# Patient Record
Sex: MALE | Race: AMERICAN INDIAN OR ALASKA NATIVE | NOT HISPANIC OR LATINO | Employment: OTHER | URBAN - METROPOLITAN AREA
[De-identification: names, ages, dates, MRNs, and addresses within clinical notes are randomized per-mention and may not be internally consistent; named-entity substitution may affect disease eponyms.]

---

## 2023-10-02 ENCOUNTER — EXTERNAL HOSPITAL ADMISSION (OUTPATIENT)
Dept: NEUROSURGERY | Facility: CLINIC | Age: 28
End: 2023-10-02
Payer: COMMERCIAL

## 2023-10-06 ENCOUNTER — APPOINTMENT (OUTPATIENT)
Dept: RADIOLOGY | Facility: HOSPITAL | Age: 28
DRG: 086 | End: 2023-10-06
Payer: COMMERCIAL

## 2023-10-06 ENCOUNTER — HOSPITAL ENCOUNTER (OUTPATIENT)
Facility: HOSPITAL | Age: 28
Setting detail: OBSERVATION
LOS: 1 days | Discharge: HOME | DRG: 086 | End: 2023-10-06
Attending: NEUROLOGICAL SURGERY | Admitting: NEUROLOGICAL SURGERY
Payer: COMMERCIAL

## 2023-10-06 VITALS
DIASTOLIC BLOOD PRESSURE: 94 MMHG | WEIGHT: 176.37 LBS | BODY MASS INDEX: 22.63 KG/M2 | OXYGEN SATURATION: 99 % | RESPIRATION RATE: 13 BRPM | HEIGHT: 74 IN | TEMPERATURE: 99.6 F | HEART RATE: 62 BPM | SYSTOLIC BLOOD PRESSURE: 137 MMHG

## 2023-10-06 DIAGNOSIS — G08 ACUTE IDIOPATHIC CVST (HHS-HCC): ICD-10-CM

## 2023-10-06 DIAGNOSIS — R60.9 SWELLING: Primary | ICD-10-CM

## 2023-10-06 PROBLEM — S06.5XAA: Status: ACTIVE | Noted: 2023-10-06

## 2023-10-06 PROBLEM — S43.102A: Status: ACTIVE | Noted: 2023-10-06

## 2023-10-06 PROBLEM — S02.19XD CLOSED FRACTURE OF TEMPORAL BONE WITH ROUTINE HEALING: Status: ACTIVE | Noted: 2023-10-06

## 2023-10-06 LAB
ALBUMIN SERPL BCP-MCNC: 5 G/DL (ref 3.4–5)
ANION GAP SERPL CALC-SCNC: 20 MMOL/L (ref 10–20)
APTT PPP: 32 SECONDS (ref 27–38)
BUN SERPL-MCNC: 16 MG/DL (ref 6–23)
CALCIUM SERPL-MCNC: 10.3 MG/DL (ref 8.6–10.6)
CARDIAC TROPONIN I PNL SERPL HS: 3 NG/L (ref 0–53)
CHLORIDE SERPL-SCNC: 104 MMOL/L (ref 98–107)
CO2 SERPL-SCNC: 19 MMOL/L (ref 21–32)
CREAT SERPL-MCNC: 0.74 MG/DL (ref 0.5–1.3)
ERYTHROCYTE [DISTWIDTH] IN BLOOD BY AUTOMATED COUNT: 13.2 % (ref 11.5–14.5)
GFR SERPL CREATININE-BSD FRML MDRD: >90 ML/MIN/1.73M*2
GLUCOSE BLD MANUAL STRIP-MCNC: 103 MG/DL (ref 74–99)
GLUCOSE BLD MANUAL STRIP-MCNC: 106 MG/DL (ref 74–99)
GLUCOSE BLD MANUAL STRIP-MCNC: 113 MG/DL (ref 74–99)
GLUCOSE BLD MANUAL STRIP-MCNC: 95 MG/DL (ref 74–99)
GLUCOSE SERPL-MCNC: 122 MG/DL (ref 74–99)
HCT VFR BLD AUTO: 46.4 % (ref 41–52)
HGB BLD-MCNC: 15.9 G/DL (ref 13.5–17.5)
INR PPP: 1.2 (ref 0.9–1.1)
MAGNESIUM SERPL-MCNC: 2.02 MG/DL (ref 1.6–2.4)
MCH RBC QN AUTO: 27.7 PG (ref 26–34)
MCHC RBC AUTO-ENTMCNC: 34.3 G/DL (ref 32–36)
MCV RBC AUTO: 81 FL (ref 80–100)
NRBC BLD-RTO: 0 /100 WBCS (ref 0–0)
PHOSPHATE SERPL-MCNC: 5.2 MG/DL (ref 2.5–4.9)
PLATELET # BLD AUTO: 319 X10*3/UL (ref 150–450)
PMV BLD AUTO: 9.6 FL (ref 7.5–11.5)
POTASSIUM SERPL-SCNC: 4.6 MMOL/L (ref 3.5–5.3)
PROTHROMBIN TIME: 13.5 SECONDS (ref 9.8–12.8)
RBC # BLD AUTO: 5.73 X10*6/UL (ref 4.5–5.9)
SODIUM SERPL-SCNC: 138 MMOL/L (ref 136–145)
UFH PPP CHRO-ACNC: 0.1 IU/ML
UFH PPP CHRO-ACNC: 0.3 IU/ML
UFH PPP CHRO-ACNC: 0.7 IU/ML
UFH PPP CHRO-ACNC: 0.9 IU/ML
UFH PPP CHRO-ACNC: 1 IU/ML
UFH PPP CHRO-ACNC: <0.1 IU/ML
WBC # BLD AUTO: 14.6 X10*3/UL (ref 4.4–11.3)

## 2023-10-06 PROCEDURE — 85730 THROMBOPLASTIN TIME PARTIAL: CPT | Performed by: STUDENT IN AN ORGANIZED HEALTH CARE EDUCATION/TRAINING PROGRAM

## 2023-10-06 PROCEDURE — 36415 COLL VENOUS BLD VENIPUNCTURE: CPT | Performed by: REGISTERED NURSE

## 2023-10-06 PROCEDURE — 2550000001 HC RX 255 CONTRASTS: Performed by: NEUROLOGICAL SURGERY

## 2023-10-06 PROCEDURE — 85027 COMPLETE CBC AUTOMATED: CPT | Performed by: STUDENT IN AN ORGANIZED HEALTH CARE EDUCATION/TRAINING PROGRAM

## 2023-10-06 PROCEDURE — 2500000001 HC RX 250 WO HCPCS SELF ADMINISTERED DRUGS (ALT 637 FOR MEDICARE OP): Performed by: STUDENT IN AN ORGANIZED HEALTH CARE EDUCATION/TRAINING PROGRAM

## 2023-10-06 PROCEDURE — 82947 ASSAY GLUCOSE BLOOD QUANT: CPT | Performed by: STUDENT IN AN ORGANIZED HEALTH CARE EDUCATION/TRAINING PROGRAM

## 2023-10-06 PROCEDURE — 70496 CT ANGIOGRAPHY HEAD: CPT | Performed by: RADIOLOGY

## 2023-10-06 PROCEDURE — 85520 HEPARIN ASSAY: CPT | Performed by: STUDENT IN AN ORGANIZED HEALTH CARE EDUCATION/TRAINING PROGRAM

## 2023-10-06 PROCEDURE — G0378 HOSPITAL OBSERVATION PER HR: HCPCS

## 2023-10-06 PROCEDURE — 99292 CRITICAL CARE ADDL 30 MIN: CPT | Performed by: REGISTERED NURSE

## 2023-10-06 PROCEDURE — G1004 CDSM NDSC: HCPCS

## 2023-10-06 PROCEDURE — 99254 IP/OBS CNSLTJ NEW/EST MOD 60: CPT | Performed by: STUDENT IN AN ORGANIZED HEALTH CARE EDUCATION/TRAINING PROGRAM

## 2023-10-06 PROCEDURE — 83735 ASSAY OF MAGNESIUM: CPT | Performed by: STUDENT IN AN ORGANIZED HEALTH CARE EDUCATION/TRAINING PROGRAM

## 2023-10-06 PROCEDURE — 80069 RENAL FUNCTION PANEL: CPT | Performed by: STUDENT IN AN ORGANIZED HEALTH CARE EDUCATION/TRAINING PROGRAM

## 2023-10-06 PROCEDURE — 99291 CRITICAL CARE FIRST HOUR: CPT | Performed by: NEUROLOGICAL SURGERY

## 2023-10-06 PROCEDURE — 2500000002 HC RX 250 W HCPCS SELF ADMINISTERED DRUGS (ALT 637 FOR MEDICARE OP, ALT 636 FOR OP/ED): Performed by: STUDENT IN AN ORGANIZED HEALTH CARE EDUCATION/TRAINING PROGRAM

## 2023-10-06 PROCEDURE — 70498 CT ANGIOGRAPHY NECK: CPT | Mod: MG

## 2023-10-06 PROCEDURE — 73030 X-RAY EXAM OF SHOULDER: CPT | Mod: LT,FY

## 2023-10-06 PROCEDURE — 70498 CT ANGIOGRAPHY NECK: CPT | Performed by: RADIOLOGY

## 2023-10-06 PROCEDURE — 84484 ASSAY OF TROPONIN QUANT: CPT | Performed by: REGISTERED NURSE

## 2023-10-06 PROCEDURE — 36415 COLL VENOUS BLD VENIPUNCTURE: CPT | Performed by: STUDENT IN AN ORGANIZED HEALTH CARE EDUCATION/TRAINING PROGRAM

## 2023-10-06 PROCEDURE — 2500000004 HC RX 250 GENERAL PHARMACY W/ HCPCS (ALT 636 FOR OP/ED): Performed by: STUDENT IN AN ORGANIZED HEALTH CARE EDUCATION/TRAINING PROGRAM

## 2023-10-06 PROCEDURE — 2020000001 HC ICU ROOM DAILY

## 2023-10-06 PROCEDURE — 2500000004 HC RX 250 GENERAL PHARMACY W/ HCPCS (ALT 636 FOR OP/ED): Performed by: REGISTERED NURSE

## 2023-10-06 PROCEDURE — 73030 X-RAY EXAM OF SHOULDER: CPT | Mod: LEFT SIDE | Performed by: RADIOLOGY

## 2023-10-06 PROCEDURE — 99255 IP/OBS CONSLTJ NEW/EST HI 80: CPT

## 2023-10-06 PROCEDURE — 82947 ASSAY GLUCOSE BLOOD QUANT: CPT | Mod: 91

## 2023-10-06 RX ORDER — CIPROFLOXACIN AND DEXAMETHASONE 3; 1 MG/ML; MG/ML
2 SUSPENSION/ DROPS AURICULAR (OTIC) 2 TIMES DAILY
Status: DISCONTINUED | OUTPATIENT
Start: 2023-10-06 | End: 2023-10-07 | Stop reason: HOSPADM

## 2023-10-06 RX ORDER — PREDNISONE 10 MG/1
TABLET ORAL
Qty: 105 TABLET | Refills: 0 | Status: SHIPPED | OUTPATIENT
Start: 2023-10-06 | End: 2023-10-18 | Stop reason: ALTCHOICE

## 2023-10-06 RX ORDER — SUMATRIPTAN 6 MG/.5ML
6 INJECTION, SOLUTION SUBCUTANEOUS ONCE
COMMUNITY
End: 2023-10-06 | Stop reason: HOSPADM

## 2023-10-06 RX ORDER — MORPHINE SULFATE IN 0.9 % NACL 5 MG/ML
4 PLASTIC BAG, INJECTION (ML) INTRAVENOUS
COMMUNITY
End: 2023-10-06 | Stop reason: HOSPADM

## 2023-10-06 RX ORDER — SODIUM CHLORIDE 9 MG/ML
75 INJECTION, SOLUTION INTRAVENOUS CONTINUOUS
Status: DISCONTINUED | OUTPATIENT
Start: 2023-10-06 | End: 2023-10-06

## 2023-10-06 RX ORDER — INSULIN LISPRO 100 [IU]/ML
0-5 INJECTION, SOLUTION INTRAVENOUS; SUBCUTANEOUS
Status: DISCONTINUED | OUTPATIENT
Start: 2023-10-06 | End: 2023-10-07 | Stop reason: HOSPADM

## 2023-10-06 RX ORDER — PREDNISONE 10 MG/1
TABLET ORAL
Qty: 105 TABLET | Refills: 0 | Status: SHIPPED | OUTPATIENT
Start: 2023-10-06 | End: 2023-10-06 | Stop reason: SDUPTHER

## 2023-10-06 RX ORDER — METHYLPREDNISOLONE SODIUM SUCCINATE 40 MG/ML
40 INJECTION INTRAMUSCULAR; INTRAVENOUS ONCE
COMMUNITY
End: 2023-10-06 | Stop reason: HOSPADM

## 2023-10-06 RX ORDER — POLYETHYLENE GLYCOL 3350 17 G/17G
17 POWDER, FOR SOLUTION ORAL DAILY
COMMUNITY
End: 2023-10-06 | Stop reason: HOSPADM

## 2023-10-06 RX ORDER — AMOXICILLIN 250 MG
2 CAPSULE ORAL 2 TIMES DAILY
Status: DISCONTINUED | OUTPATIENT
Start: 2023-10-06 | End: 2023-10-07 | Stop reason: HOSPADM

## 2023-10-06 RX ORDER — DEXTROSE 50 % IN WATER (D50W) INTRAVENOUS SYRINGE
25
Status: DISCONTINUED | OUTPATIENT
Start: 2023-10-06 | End: 2023-10-07 | Stop reason: HOSPADM

## 2023-10-06 RX ORDER — HEPARIN SODIUM 10000 [USP'U]/100ML
0-4500 INJECTION, SOLUTION INTRAVENOUS CONTINUOUS
Status: DISCONTINUED | OUTPATIENT
Start: 2023-10-06 | End: 2023-10-06

## 2023-10-06 RX ORDER — ACETAMINOPHEN 325 MG/1
650 TABLET ORAL EVERY 6 HOURS PRN
Status: DISCONTINUED | OUTPATIENT
Start: 2023-10-06 | End: 2023-10-07 | Stop reason: HOSPADM

## 2023-10-06 RX ORDER — HEPARIN SODIUM 5000 [USP'U]/ML
80 INJECTION, SOLUTION INTRAVENOUS; SUBCUTANEOUS ONCE
Status: COMPLETED | OUTPATIENT
Start: 2023-10-06 | End: 2023-10-06

## 2023-10-06 RX ORDER — PANTOPRAZOLE SODIUM 40 MG/1
40 TABLET, DELAYED RELEASE ORAL
COMMUNITY
End: 2023-12-12 | Stop reason: ALTCHOICE

## 2023-10-06 RX ORDER — DEXTROSE MONOHYDRATE 100 MG/ML
0.3 INJECTION, SOLUTION INTRAVENOUS ONCE AS NEEDED
Status: DISCONTINUED | OUTPATIENT
Start: 2023-10-06 | End: 2023-10-07 | Stop reason: HOSPADM

## 2023-10-06 RX ORDER — HEPARIN SODIUM 5000 [USP'U]/ML
3000-6000 INJECTION, SOLUTION INTRAVENOUS; SUBCUTANEOUS EVERY 4 HOURS PRN
Status: DISCONTINUED | OUTPATIENT
Start: 2023-10-06 | End: 2023-10-06

## 2023-10-06 RX ORDER — DEXTROMETHORPHAN HYDROBROMIDE, GUAIFENESIN 5; 100 MG/5ML; MG/5ML
650 LIQUID ORAL EVERY 4 HOURS PRN
COMMUNITY
End: 2023-11-06 | Stop reason: ALTCHOICE

## 2023-10-06 RX ORDER — PANTOPRAZOLE SODIUM 40 MG/1
40 TABLET, DELAYED RELEASE ORAL
Status: DISCONTINUED | OUTPATIENT
Start: 2023-10-06 | End: 2023-10-07 | Stop reason: HOSPADM

## 2023-10-06 RX ORDER — OXYCODONE HYDROCHLORIDE 5 MG/1
5 CAPSULE ORAL EVERY 4 HOURS PRN
COMMUNITY
End: 2023-10-18 | Stop reason: ALTCHOICE

## 2023-10-06 RX ADMIN — ACETAMINOPHEN 650 MG: 325 TABLET ORAL at 07:53

## 2023-10-06 RX ADMIN — CIPROFLOXACIN AND DEXAMETHASONE 2 DROP: 3; 1 SUSPENSION/ DROPS AURICULAR (OTIC) at 20:59

## 2023-10-06 RX ADMIN — HEPARIN SODIUM 1400 UNITS/HR: 10000 INJECTION, SOLUTION INTRAVENOUS at 04:33

## 2023-10-06 RX ADMIN — CIPROFLOXACIN AND DEXAMETHASONE 2 DROP: 3; 1 SUSPENSION/ DROPS AURICULAR (OTIC) at 08:01

## 2023-10-06 RX ADMIN — ACETAMINOPHEN 650 MG: 325 TABLET ORAL at 16:27

## 2023-10-06 RX ADMIN — SODIUM CHLORIDE 75 ML/HR: 9 INJECTION, SOLUTION INTRAVENOUS at 03:29

## 2023-10-06 RX ADMIN — PANTOPRAZOLE SODIUM 40 MG: 40 TABLET, DELAYED RELEASE ORAL at 07:54

## 2023-10-06 RX ADMIN — SENNOSIDES AND DOCUSATE SODIUM 2 TABLET: 8.6; 5 TABLET ORAL at 09:00

## 2023-10-06 RX ADMIN — IOHEXOL 76 ML: 350 INJECTION, SOLUTION INTRAVENOUS at 15:00

## 2023-10-06 RX ADMIN — HEPARIN SODIUM 6250 UNITS: 5000 INJECTION, SOLUTION INTRAVENOUS; SUBCUTANEOUS at 04:36

## 2023-10-06 RX ADMIN — CARBOXYMETHYLCELLULOSE SODIUM 1 DROP: 5 SOLUTION/ DROPS OPHTHALMIC at 07:54

## 2023-10-06 SDOH — SOCIAL STABILITY: SOCIAL INSECURITY: DO YOU FEEL ANYONE HAS EXPLOITED OR TAKEN ADVANTAGE OF YOU FINANCIALLY OR OF YOUR PERSONAL PROPERTY?: NO

## 2023-10-06 SDOH — SOCIAL STABILITY: SOCIAL INSECURITY: DOES ANYONE TRY TO KEEP YOU FROM HAVING/CONTACTING OTHER FRIENDS OR DOING THINGS OUTSIDE YOUR HOME?: NO

## 2023-10-06 SDOH — SOCIAL STABILITY: SOCIAL INSECURITY: HAS ANYONE EVER THREATENED TO HURT YOUR FAMILY OR YOUR PETS?: NO

## 2023-10-06 SDOH — SOCIAL STABILITY: SOCIAL INSECURITY: DO YOU FEEL UNSAFE GOING BACK TO THE PLACE WHERE YOU ARE LIVING?: NO

## 2023-10-06 SDOH — ECONOMIC STABILITY: INCOME INSECURITY: HOW HARD IS IT FOR YOU TO PAY FOR THE VERY BASICS LIKE FOOD, HOUSING, MEDICAL CARE, AND HEATING?: NOT HARD AT ALL

## 2023-10-06 SDOH — SOCIAL STABILITY: SOCIAL INSECURITY: ARE YOU OR HAVE YOU BEEN THREATENED OR ABUSED PHYSICALLY, EMOTIONALLY, OR SEXUALLY BY ANYONE?: NO

## 2023-10-06 SDOH — ECONOMIC STABILITY: TRANSPORTATION INSECURITY
IN THE PAST 12 MONTHS, HAS THE LACK OF TRANSPORTATION KEPT YOU FROM MEDICAL APPOINTMENTS OR FROM GETTING MEDICATIONS?: NO

## 2023-10-06 SDOH — SOCIAL STABILITY: SOCIAL INSECURITY: WITHIN THE LAST YEAR, HAVE YOU BEEN AFRAID OF YOUR PARTNER OR EX-PARTNER?: NO

## 2023-10-06 SDOH — ECONOMIC STABILITY: INCOME INSECURITY: IN THE LAST 12 MONTHS, WAS THERE A TIME WHEN YOU WERE NOT ABLE TO PAY THE MORTGAGE OR RENT ON TIME?: NO

## 2023-10-06 SDOH — ECONOMIC STABILITY: HOUSING INSECURITY
IN THE LAST 12 MONTHS, WAS THERE A TIME WHEN YOU DID NOT HAVE A STEADY PLACE TO SLEEP OR SLEPT IN A SHELTER (INCLUDING NOW)?: NO

## 2023-10-06 SDOH — SOCIAL STABILITY: SOCIAL INSECURITY: HAVE YOU HAD THOUGHTS OF HARMING ANYONE ELSE?: NO

## 2023-10-06 SDOH — SOCIAL STABILITY: SOCIAL INSECURITY
WITHIN THE LAST YEAR, HAVE YOU BEEN KICKED, HIT, SLAPPED, OR OTHERWISE PHYSICALLY HURT BY YOUR PARTNER OR EX-PARTNER?: NO

## 2023-10-06 SDOH — SOCIAL STABILITY: SOCIAL INSECURITY: ARE THERE ANY APPARENT SIGNS OF INJURIES/BEHAVIORS THAT COULD BE RELATED TO ABUSE/NEGLECT?: NO

## 2023-10-06 SDOH — ECONOMIC STABILITY: FOOD INSECURITY: WITHIN THE PAST 12 MONTHS, YOU WORRIED THAT YOUR FOOD WOULD RUN OUT BEFORE YOU GOT MONEY TO BUY MORE.: NEVER TRUE

## 2023-10-06 SDOH — ECONOMIC STABILITY: INCOME INSECURITY: IN THE PAST 12 MONTHS, HAS THE ELECTRIC, GAS, OIL, OR WATER COMPANY THREATENED TO SHUT OFF SERVICE IN YOUR HOME?: NO

## 2023-10-06 SDOH — SOCIAL STABILITY: SOCIAL INSECURITY: WITHIN THE LAST YEAR, HAVE YOU BEEN HUMILIATED OR EMOTIONALLY ABUSED IN OTHER WAYS BY YOUR PARTNER OR EX-PARTNER?: NO

## 2023-10-06 SDOH — SOCIAL STABILITY: SOCIAL INSECURITY: ABUSE: ADULT

## 2023-10-06 SDOH — SOCIAL STABILITY: SOCIAL INSECURITY
WITHIN THE LAST YEAR, HAVE TO BEEN RAPED OR FORCED TO HAVE ANY KIND OF SEXUAL ACTIVITY BY YOUR PARTNER OR EX-PARTNER?: NO

## 2023-10-06 SDOH — SOCIAL STABILITY: SOCIAL INSECURITY: WERE YOU ABLE TO COMPLETE ALL THE BEHAVIORAL HEALTH SCREENINGS?: YES

## 2023-10-06 SDOH — ECONOMIC STABILITY: FOOD INSECURITY: WITHIN THE PAST 12 MONTHS, THE FOOD YOU BOUGHT JUST DIDN'T LAST AND YOU DIDN'T HAVE MONEY TO GET MORE.: NEVER TRUE

## 2023-10-06 SDOH — ECONOMIC STABILITY: HOUSING INSECURITY: IN THE LAST 12 MONTHS, HOW MANY PLACES HAVE YOU LIVED?: 1

## 2023-10-06 SDOH — ECONOMIC STABILITY: TRANSPORTATION INSECURITY
IN THE PAST 12 MONTHS, HAS LACK OF TRANSPORTATION KEPT YOU FROM MEETINGS, WORK, OR FROM GETTING THINGS NEEDED FOR DAILY LIVING?: NO

## 2023-10-06 ASSESSMENT — PAIN - FUNCTIONAL ASSESSMENT
PAIN_FUNCTIONAL_ASSESSMENT: 0-10

## 2023-10-06 ASSESSMENT — COGNITIVE AND FUNCTIONAL STATUS - GENERAL
DAILY ACTIVITIY SCORE: 24
MOBILITY SCORE: 24
PATIENT BASELINE BEDBOUND: NO
PATIENT BASELINE BEDBOUND: NO

## 2023-10-06 ASSESSMENT — LIFESTYLE VARIABLES
AUDIT-C TOTAL SCORE: 0
AUDIT-C TOTAL SCORE: 0
HOW OFTEN DO YOU HAVE A DRINK CONTAINING ALCOHOL: NEVER
SKIP TO QUESTIONS 9-10: 1
HOW MANY STANDARD DRINKS CONTAINING ALCOHOL DO YOU HAVE ON A TYPICAL DAY: 1 OR 2
HOW OFTEN DO YOU HAVE A DRINK CONTAINING ALCOHOL: MONTHLY OR LESS
HOW OFTEN DO YOU HAVE 6 OR MORE DRINKS ON ONE OCCASION: NEVER
AUDIT-C TOTAL SCORE: 0
HOW OFTEN DO YOU HAVE 6 OR MORE DRINKS ON ONE OCCASION: NEVER
HOW OFTEN DO YOU HAVE A DRINK CONTAINING ALCOHOL: NEVER
HOW MANY STANDARD DRINKS CONTAINING ALCOHOL DO YOU HAVE ON A TYPICAL DAY: PATIENT DOES NOT DRINK
AUDIT-C TOTAL SCORE: 0
AUDIT-C TOTAL SCORE: 1
PRESCIPTION_ABUSE_PAST_12_MONTHS: YES
SKIP TO QUESTIONS 9-10: 1
AUDIT-C TOTAL SCORE: 1
HOW MANY STANDARD DRINKS CONTAINING ALCOHOL DO YOU HAVE ON A TYPICAL DAY: PATIENT DOES NOT DRINK
SUBSTANCE_ABUSE_PAST_12_MONTHS: YES
SKIP TO QUESTIONS 9-10: 1
HOW OFTEN DO YOU HAVE 6 OR MORE DRINKS ON ONE OCCASION: NEVER

## 2023-10-06 ASSESSMENT — ACTIVITIES OF DAILY LIVING (ADL)
WALKS IN HOME: INDEPENDENT
DRESSING YOURSELF: INDEPENDENT
TOILETING: INDEPENDENT
PATIENT'S MEMORY ADEQUATE TO SAFELY COMPLETE DAILY ACTIVITIES?: YES
HEARING - LEFT EAR: DIFFICULTY WITH NOISE
HEARING - RIGHT EAR: FUNCTIONAL
BATHING: INDEPENDENT
BATHING: INDEPENDENT
HEARING - RIGHT EAR: FUNCTIONAL
FEEDING YOURSELF: INDEPENDENT
ADEQUATE_TO_COMPLETE_ADL: YES
WALKS IN HOME: INDEPENDENT
ADEQUATE_TO_COMPLETE_ADL: YES
DRESSING YOURSELF: INDEPENDENT
GROOMING: INDEPENDENT
TOILETING: INDEPENDENT
JUDGMENT_ADEQUATE_SAFELY_COMPLETE_DAILY_ACTIVITIES: YES
GROOMING: INDEPENDENT
FEEDING YOURSELF: INDEPENDENT
ASSISTIVE_DEVICE: EYEGLASSES
PATIENT'S MEMORY ADEQUATE TO SAFELY COMPLETE DAILY ACTIVITIES?: YES
JUDGMENT_ADEQUATE_SAFELY_COMPLETE_DAILY_ACTIVITIES: YES

## 2023-10-06 ASSESSMENT — PAIN SCALES - GENERAL
PAINLEVEL_OUTOF10: 2
PAINLEVEL_OUTOF10: 0 - NO PAIN
PAINLEVEL_OUTOF10: 3
PAINLEVEL_OUTOF10: 0 - NO PAIN
PAINLEVEL_OUTOF10: 3

## 2023-10-06 ASSESSMENT — ENCOUNTER SYMPTOMS
FACIAL ASYMMETRY: 1
WHEEZING: 0
FEVER: 0
ABDOMINAL PAIN: 0
UNEXPECTED WEIGHT CHANGE: 0
CHEST TIGHTNESS: 0
VOMITING: 0
CHILLS: 0
DIARRHEA: 0
TROUBLE SWALLOWING: 0
FATIGUE: 0
NAUSEA: 0
SHORTNESS OF BREATH: 0
EYE PAIN: 0
HEADACHES: 1

## 2023-10-06 ASSESSMENT — PATIENT HEALTH QUESTIONNAIRE - PHQ9
SUM OF ALL RESPONSES TO PHQ9 QUESTIONS 1 & 2: 0
1. LITTLE INTEREST OR PLEASURE IN DOING THINGS: NOT AT ALL
2. FEELING DOWN, DEPRESSED OR HOPELESS: NOT AT ALL
1. LITTLE INTEREST OR PLEASURE IN DOING THINGS: NOT AT ALL
2. FEELING DOWN, DEPRESSED OR HOPELESS: NOT AT ALL
1. LITTLE INTEREST OR PLEASURE IN DOING THINGS: NOT AT ALL
2. FEELING DOWN, DEPRESSED OR HOPELESS: NOT AT ALL

## 2023-10-06 NOTE — PROGRESS NOTES
Patient is discussed during interdisciplinary round today.   Team members Present : Interdisciplinary team   Plan per medical team : Pt is not medically ready to discharge currently. PT/OT will evaluate pt when pt is stable.   Planned disposition : TBD  Discharge destination : TBD  Payor : Medicaid  Status : inpatient  Estimated discharge date : TBD  CHECO met with pt's father, Cristopher Quinones at bed side to complete initial assessment for offering support and obtaining information for pt's discharge plan.  Pt is in sleeping and Cristopher participated in assessment. Pt lives in a house with his brother independently. Pt's father, Cristopher reported pt is Asperger Syndrome, but very high functioning. For transportation, pt's parents and brother assist, pt is independent overall. Pt has no PCP currently since pt and family have moved to this area recently. Cristopher stated he and pt's mother Tram will take care of pt after discharge until he gets fully recovered. SW made Cristopher aware that SW is available for issues or questions on social work, and Cristopher denied any further issues or questions on social work at the moment of assessment.  SW and PCN will continue to follow and assist with discharge plan.     Morgan Steen LCSW
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
## 2023-10-06 NOTE — PROGRESS NOTES
10/06/23 1522   Discharge Planning   Living Arrangements Family members  (living with brother)   Support Systems Parent;Family members   Assistance Needed Independent previously / TBD after PT/OT evaluation   Type of Residence Private residence   Number of Stairs to Enter Residence 0   Number of Stairs Within Residence 1   Do you have animals or pets at home? No   Who is requesting discharge planning? Provider   Home or Post Acute Services   (TBD)   Patient expects to be discharged to: Home vs Rehab   Does the patient need discharge transport arranged? Yes  (depends on LOC)
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
## 2023-10-06 NOTE — CARE PLAN
The patient's goals for the shift include      The clinical goals for the shift include Patient will have pain controlled throughout shift    Over the shift, the patient did not make progress toward the following goals. Barriers to progression include multiple injuries, intermit numbness, and limited movement of the LUE. Recommendations to address these barriers include continued pain assessment, turning and repositioning, and heat/cold application.
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
## 2023-10-06 NOTE — CARE PLAN
Problem: Pain  Goal: Takes deep breaths with improved pain control throughout the shift  Outcome: Progressing     Problem: Pain  Goal: Turns in bed with improved pain control throughout the shift  Outcome: Progressing     Problem: Pain  Goal: Performs ADL's with improved pain control throughout shift  Outcome: Progressing   The patient's goals for the shift include      The clinical goals for the shift include Patient will have pain controlled throughout shift    Over the shift, the patient did not make progress toward the following goals. Barriers to progression include .. Recommendations to address these barriers include ..    Problem: Pain  Goal: Participates in PT with improved pain control throughout the shift  Outcome: Progressing     Problem: Pain  Goal: Free from opioid side effects throughout the shift  Outcome: Progressing     Problem: Pain  Goal: Free from acute confusion related to pain meds throughout the shift  Outcome: Progressing
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
## 2023-10-07 NOTE — CARE PLAN
Problem: Pain  Goal: Takes deep breaths with improved pain control throughout the shift  Outcome: Adequate for Discharge  Goal: Turns in bed with improved pain control throughout the shift  Outcome: Adequate for Discharge  Goal: Walks with improved pain control throughout the shift  Outcome: Adequate for Discharge  Goal: Performs ADL's with improved pain control throughout shift  Outcome: Adequate for Discharge  Goal: Participates in PT with improved pain control throughout the shift  Outcome: Adequate for Discharge  Goal: Free from opioid side effects throughout the shift  Outcome: Adequate for Discharge  Goal: Free from acute confusion related to pain meds throughout the shift  Outcome: Adequate for Discharge   The patient's goals for the shift include      The clinical goals for the shift include Patient will have pain controlled throughout shift    Over the shift, the patient did not make progress toward the following goals. Barriers to progression include   Problem: Pain  Goal: Takes deep breaths with improved pain control throughout the shift  Outcome: Adequate for Discharge  Goal: Turns in bed with improved pain control throughout the shift  Outcome: Adequate for Discharge  Goal: Walks with improved pain control throughout the shift  Outcome: Adequate for Discharge  Goal: Performs ADL's with improved pain control throughout shift  Outcome: Adequate for Discharge  Goal: Participates in PT with improved pain control throughout the shift  Outcome: Adequate for Discharge  Goal: Free from opioid side effects throughout the shift  Outcome: Adequate for Discharge  Goal: Free from acute confusion related to pain meds throughout the shift  Outcome: Adequate for Discharge   . Recommendations to address these barriers include   Problem: Pain  Goal: Takes deep breaths with improved pain control throughout the shift  Outcome: Adequate for Discharge  Goal: Turns in bed with improved pain control throughout the  shift  Outcome: Adequate for Discharge  Goal: Walks with improved pain control throughout the shift  Outcome: Adequate for Discharge  Goal: Performs ADL's with improved pain control throughout shift  Outcome: Adequate for Discharge  Goal: Participates in PT with improved pain control throughout the shift  Outcome: Adequate for Discharge  Goal: Free from opioid side effects throughout the shift  Outcome: Adequate for Discharge  Goal: Free from acute confusion related to pain meds throughout the shift  Outcome: Adequate for Discharge   .

## 2023-10-09 ENCOUNTER — HOSPITAL ENCOUNTER (OUTPATIENT)
Dept: CARDIOLOGY | Facility: HOSPITAL | Age: 28
Discharge: HOME | End: 2023-10-09
Payer: COMMERCIAL

## 2023-10-09 LAB
ATRIAL RATE: 47 BPM
P AXIS: 63 DEGREES
P OFFSET: 200 MS
P ONSET: 147 MS
PR INTERVAL: 142 MS
Q ONSET: 218 MS
QRS COUNT: 7 BEATS
QRS DURATION: 90 MS
QT INTERVAL: 506 MS
QTC CALCULATION(BAZETT): 447 MS
QTC FREDERICIA: 466 MS
R AXIS: 56 DEGREES
T AXIS: 68 DEGREES
T OFFSET: 471 MS
VENTRICULAR RATE: 47 BPM

## 2023-10-09 PROCEDURE — 93005 ELECTROCARDIOGRAM TRACING: CPT
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
## 2023-10-17 ENCOUNTER — ANCILLARY PROCEDURE (OUTPATIENT)
Dept: RADIOLOGY | Facility: CLINIC | Age: 28
End: 2023-10-17
Payer: COMMERCIAL

## 2023-10-17 ENCOUNTER — OFFICE VISIT (OUTPATIENT)
Dept: ORTHOPEDIC SURGERY | Facility: CLINIC | Age: 28
End: 2023-10-17
Payer: COMMERCIAL

## 2023-10-17 DIAGNOSIS — M25.512 LEFT SHOULDER PAIN, UNSPECIFIED CHRONICITY: Primary | ICD-10-CM

## 2023-10-17 DIAGNOSIS — S42.145A: ICD-10-CM

## 2023-10-17 DIAGNOSIS — M25.512 LEFT SHOULDER PAIN, UNSPECIFIED CHRONICITY: ICD-10-CM

## 2023-10-17 PROCEDURE — 99214 OFFICE O/P EST MOD 30 MIN: CPT | Performed by: STUDENT IN AN ORGANIZED HEALTH CARE EDUCATION/TRAINING PROGRAM

## 2023-10-17 PROCEDURE — L3670 SO ACRO/CLAV CAN WEB PRE OTS: HCPCS | Performed by: STUDENT IN AN ORGANIZED HEALTH CARE EDUCATION/TRAINING PROGRAM

## 2023-10-17 PROCEDURE — 73030 X-RAY EXAM OF SHOULDER: CPT | Mod: LT

## 2023-10-17 PROCEDURE — 73030 X-RAY EXAM OF SHOULDER: CPT | Mod: LEFT SIDE | Performed by: RADIOLOGY

## 2023-10-17 PROCEDURE — 99204 OFFICE O/P NEW MOD 45 MIN: CPT | Performed by: STUDENT IN AN ORGANIZED HEALTH CARE EDUCATION/TRAINING PROGRAM

## 2023-10-17 NOTE — PROGRESS NOTES
CHIEF COMPLAINT:   Chief Complaint   Patient presents with    Right Shoulder - Pain       History: 28 y.o. male presents to the office today for Evaluation of his left shoulder.  He was in a long boarding accident about 3 weeks ago.  He sustained multiple other injuries to his head and neck area.  He was found to have a left scapular fracture and is here today for follow-up.  He has been in a sling since the time of injury.  He says that he has not exactly been abiding by restrictions.  He says his tried long boarding again since his injury and has fallen 3 times onto the shoulder again.  Denies any numbness or tingling.  Previously prior to the injury he said that his shoulder was functioning 100%.     Past medical history, past surgical history, medications, allergies, family history, social history, and review of systems were reviewed today.    A 12 point review of systems was negative other than as stated in the HPI.    No past medical history on file.     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     No family history on file.     Social History     Socioeconomic History    Marital status: Single     Spouse name: Not on file    Number of children: Not on file    Years of education: Not on file    Highest education level: Not on file   Occupational History    Not on file   Tobacco Use    Smoking status: Not on file    Smokeless tobacco: Not on file   Substance and Sexual Activity    Alcohol use: Not on file    Drug use: Not on file    Sexual activity: Not on file   Other Topics Concern    Not on file   Social History Narrative    Not on file     Social Determinants of Health     Financial Resource Strain: Low Risk  (10/6/2023)    Overall Financial Resource Strain (CARDIA)     Difficulty of Paying Living Expenses: Not hard at all   Food Insecurity: No Food Insecurity (10/6/2023)    Hunger Vital Sign     Worried About Running Out of  Food in the Last Year: Never true     Ran Out of Food in the Last Year: Never true   Transportation Needs: No Transportation Needs (10/6/2023)    PRAPARE - Transportation     Lack of Transportation (Medical): No     Lack of Transportation (Non-Medical): No   Physical Activity: Not on file   Stress: Not on file   Social Connections: Not on file   Intimate Partner Violence: Not At Risk (10/6/2023)    Humiliation, Afraid, Rape, and Kick questionnaire     Fear of Current or Ex-Partner: No     Emotionally Abused: No     Physically Abused: No     Sexually Abused: No   Housing Stability: Low Risk  (10/6/2023)    Housing Stability Vital Sign     Unable to Pay for Housing in the Last Year: No     Number of Places Lived in the Last Year: 1     Unstable Housing in the Last Year: No        CURRENT MEDICATIONS:   Current Outpatient Medications   Medication Sig Dispense Refill    acetaminophen (Tylenol 8 HOUR) 650 mg ER tablet Take 1 tablet (650 mg) by mouth every 4 hours if needed for mild pain (1 - 3). Do not crush, chew, or split.      ciprofloxacin HCl/dexameth (CIPROFLOXACIN-DEXAMETHASONE OTIC) Administer 1 drop into affected ear(s) once daily (M-F).      lubricating eye drops ophthalmic solution Administer 1 drop into the left eye 3 times a day as needed for dry eyes.      oxyCODONE (Oxy-IR) 5 mg immediate release capsule Take 1 capsule (5 mg) by mouth every 4 hours if needed for moderate pain (4 - 6).      pantoprazole (ProtoNix) 40 mg EC tablet Take 1 tablet (40 mg) by mouth once daily in the morning. Take before meals. Do not crush, chew, or split.      predniSONE (Deltasone) 10 mg tablet Take 6 tablets (60 mg) by mouth once daily for 10 days, THEN 5 tablets (50 mg) once daily for 3 days, THEN 4 tablets (40 mg) once daily for 3 days, THEN 3 tablets (30 mg) once daily for 3 days, THEN 2 tablets (20 mg) once daily for 3 days, THEN 1 tablet (10 mg) once daily for 3 days. 105 tablet 0     No current facility-administered  medications for this visit.       Physical Examination:  Well-appearing, appears stated age, pleasant and cooperative, appropriate mood and behavior. Height and weight reviewed. Alert and oriented x3.  Auditory function intact.  No acute distress.  Intact ocular function, NYLA, EOMI. Breathing is unlabored . Full range of motion of the neck in flexion/extension and rotational movements. No significant areas of tenderness to palpation in the neck. There is no evidence of jugular venous distension. Skin appearance is normal without evidence of rash or other lesions. 2+ radial pulses bilaterally, fingers pink and wwp, good capillary refill, no pitting edema. No appreciable lymphadenopathy in bilateral upper extremities. SILT throughout both upper extremities, median/radial/ulnar/musculocutaneous/axillary nerve motor and sensory intact (except for abnormalities noted in focused musculoskeletal exam section below).     On exam of the bilateral upper extremities, range of motion of the left shoulder was deferred secondary to fracture.  Is full range of motion of the elbow wrist and hand.  On the right side is active forward flexion 170, external rotation to 60, internal rotation to T12.    Imaging: Radiographs of the left shoulder performed today.  There is also compared to previous imaging.  There does appear to be a fracture involving the scapular body extending into the superior glenoid.  Fortunately, this is minimally displaced.    Assessment: Left scapular body fracture with extension intra-articular    Plan: Long discussion the patient with treatment options and diagnosis.  Told the patient that based on the imaging today, his fracture appears minimally displaced and I think will be able to heal in its current position.  As long as the fracture stays in its current alignment, I do not think that he will require surgery.  We need to keep a close eye on this.  I want him to stay immobilized in the left shoulder for  the next 3 weeks and we will get new x-rays of the left shoulder the next visit.  Based on how things look, we will either start physical therapy, or if things have displaced, he may have to get a CT scan.  Unfortunately, given his other injuries, he really is not a good candidate for surgery currently, and as long as the fracture stays in the current alignment I think he will do well without it.    Dragon software was used to dictate this note, please be aware that minor errors in transcription may be present.    Javier Hawkins MD    Shoulder/Elbow Surgery  [facility] [place]

## 2023-10-18 RX ORDER — ONDANSETRON 4 MG/1
TABLET, FILM COATED ORAL
COMMUNITY
Start: 2023-10-02 | End: 2023-12-12 | Stop reason: ALTCHOICE

## 2023-10-22 ENCOUNTER — TELEPHONE (OUTPATIENT)
Dept: OPERATING ROOM | Facility: HOSPITAL | Age: 28
End: 2023-10-22
Payer: COMMERCIAL

## 2023-10-22 NOTE — TELEPHONE ENCOUNTER
Called patient on preferred number to let him know that his appointment with Dr. Hernandez 10/23 is cancelled due the physician needing to cancel clinic. The preferred phone number is mother, Tram's number. She will be contacted by the  to be placed on a rescheduled date. Guido does have a hearing test scheduled 10/23 at 2:30pm that he should still plan to go to if able in [id number]. Office number left for additional questions if needed.

## 2023-10-23 ENCOUNTER — CLINICAL SUPPORT (OUTPATIENT)
Dept: AUDIOLOGY | Facility: CLINIC | Age: 28
End: 2023-10-23
Payer: COMMERCIAL

## 2023-10-23 DIAGNOSIS — H90.A12 CONDUCTIVE HEARING LOSS OF LEFT EAR WITH RESTRICTED HEARING OF RIGHT EAR: Primary | ICD-10-CM

## 2023-10-23 PROCEDURE — 92550 TYMPANOMETRY & REFLEX THRESH: CPT | Performed by: AUDIOLOGIST

## 2023-10-23 PROCEDURE — 92557 COMPREHENSIVE HEARING TEST: CPT | Performed by: AUDIOLOGIST

## 2023-10-23 NOTE — LETTER
"October 24, 2023     MISTY Leger-CNP  [address] [place] [address] [address]  [address]  [facility] [address]    Patient: Guido Quinones   YOB: 1995   Date of Visit: 10/23/2023       Dear Dr. Renate Arreola, MISTY-CNP:    Thank you for referring Guido Quinones to me for evaluation. Below are my notes for this consultation.  If you have questions, please do not hesitate to call me. I look forward to following your patient along with you.       Sincerely,     Olivia Friedman, JOSSELIN, CCC-A      CC: No Recipients  ______________________________________________________________________________________    Guido Quinones, age 28, was seen today for a hearing evaluation in conjunction with an ENT visit. Guido has case history significant for bilateral temporal bone fractures and reported left sided hearing loss. He has been rescheduled for his appointment with Dr. Hernandez for medical management of these conditions. Today, Guido reported that he feels his left ear is \"90% deaf\" and stated that he has loud and bothersome tinnitus on the affected side. He stated that he has decreased sensation on that side and has feelings of fullness. Mr. Quinones reported that his hearing on his right side may have decreased as well. He reported significant vertigo when he lays back in bed and if he sits up. He denied any ear pain or infection today.     Otoscopy revealed bloody debris on the left and wax on the right. Wax was unable to be removed but a tympanogram could be measured through it.     Tympanometry:  Right Ear: Normal middle ear function with normal ear canal volume, peak pressure, and compliance.   Left Ear: Type A-d tympanogram with normal ear canal volume and peak pressure, with hypercompliance.     Ipsilateral Acoustic Reflexes:  Did not test due to wax and left ear trauma.     Pure Tone Audiometry (Behavioral)  Right Ear: Essentially borderline hearing loss.  Left Ear: Moderate sloping to severe conductive hearing loss.     Speech reception " thresholds: 10 dB HL in the right and 45 dB HL in the left.  Word Recognition Scores:  Right: 100% at 55 dB HL   Left: 100% at 85 dB HL with 55 dB HL speech masking in the contralateral ear.       Results:  Results were discussed with patient. Results indicate bilateral mobile and intact tympanic membranes, borderline hearing in the right ear, and moderate to severe hearing loss in the left ear.     The importance of medical follow up and medical management was discussed with the patient. He was counseled on today's results and made aware that he will likely need to return for further hearing evaluations as his hearing loss is medically managed. He was also counseled on appropriate ear hygiene and the misuse of Q tips. Use of wax softening drops was discussed today.     Treatment Plan:  - Follow up with ENT. Appointment scheduled with Dr. Hernandez.   - Retest hearing in conjunction with medical care.  - Follow up with medical providers as indicated.    Time: 0230- 0305    DA Bermudez AuD CCC-A

## 2023-10-23 NOTE — PROGRESS NOTES
"Guido Quinones, age 28, was seen today for a hearing evaluation in conjunction with an ENT visit. Guido has case history significant for bilateral temporal bone fractures and reported left sided hearing loss. He has been rescheduled for his appointment with Dr. Hernandez for medical management of these conditions. Today, Guido reported that he feels his left ear is \"90% deaf\" and stated that he has loud and bothersome tinnitus on the affected side. He stated that he has decreased sensation on that side and has feelings of fullness. Mr. Quinones reported that his hearing on his right side may have decreased as well. He reported significant vertigo when he lays back in bed and if he sits up. He denied any ear pain or infection today.     Otoscopy revealed bloody debris on the left and wax on the right. Wax was unable to be removed but a tympanogram could be measured through it.     Tympanometry:  Right Ear: Normal middle ear function with normal ear canal volume, peak pressure, and compliance.   Left Ear: Type A-d tympanogram with normal ear canal volume and peak pressure, with hypercompliance.     Ipsilateral Acoustic Reflexes:  Did not test due to wax and left ear trauma.     Pure Tone Audiometry (Behavioral)  Right Ear: Essentially borderline hearing loss.  Left Ear: Moderate sloping to severe conductive hearing loss.     Speech reception thresholds: 10 dB HL in the right and 45 dB HL in the left.  Word Recognition Scores:  Right: 100% at 55 dB HL   Left: 100% at 85 dB HL with 55 dB HL speech masking in the contralateral ear.       Results:  Results were discussed with patient. Results indicate bilateral mobile and intact tympanic membranes, borderline hearing in the right ear, and moderate to severe hearing loss in the left ear.     The importance of medical follow up and medical management was discussed with the patient. He was counseled on today's results and made aware that he will likely need to return for further hearing " evaluations as his hearing loss is medically managed. He was also counseled on appropriate ear hygiene and the misuse of Q tips. Use of wax softening drops was discussed today.     Treatment Plan:  - Follow up with ENT. Appointment scheduled with Dr. Hernandez.   - Retest hearing in conjunction with medical care.  - Follow up with medical providers as indicated.    Time: 0230- 0305    DA Bermudez AuD CCC-A

## 2023-10-25 ENCOUNTER — APPOINTMENT (OUTPATIENT)
Dept: PRIMARY CARE | Facility: CLINIC | Age: 28
End: 2023-10-25
Payer: COMMERCIAL

## 2023-10-31 ENCOUNTER — APPOINTMENT (OUTPATIENT)
Dept: OTOLARYNGOLOGY | Facility: CLINIC | Age: 28
End: 2023-10-31
Payer: COMMERCIAL

## 2023-11-06 ENCOUNTER — OFFICE VISIT (OUTPATIENT)
Dept: OTOLARYNGOLOGY | Facility: CLINIC | Age: 28
End: 2023-11-06
Payer: COMMERCIAL

## 2023-11-06 VITALS — WEIGHT: 190 LBS | BODY MASS INDEX: 24.38 KG/M2 | HEIGHT: 74 IN

## 2023-11-06 DIAGNOSIS — H81.11 BENIGN PAROXYSMAL POSITIONAL VERTIGO OF RIGHT EAR: ICD-10-CM

## 2023-11-06 DIAGNOSIS — S02.19XD CLOSED FRACTURE OF TEMPORAL BONE WITH ROUTINE HEALING: ICD-10-CM

## 2023-11-06 DIAGNOSIS — H90.2 CONDUCTIVE HEARING LOSS, UNSPECIFIED LATERALITY: ICD-10-CM

## 2023-11-06 DIAGNOSIS — G51.0 FACIAL NERVE PARESIS: Primary | ICD-10-CM

## 2023-11-06 DIAGNOSIS — H90.12 CONDUCTIVE HEARING LOSS OF LEFT EAR WITH UNRESTRICTED HEARING OF RIGHT EAR: ICD-10-CM

## 2023-11-06 PROCEDURE — 1036F TOBACCO NON-USER: CPT | Performed by: OTOLARYNGOLOGY

## 2023-11-06 PROCEDURE — 99214 OFFICE O/P EST MOD 30 MIN: CPT | Performed by: OTOLARYNGOLOGY

## 2023-11-06 PROCEDURE — 95992 CANALITH REPOSITIONING PROC: CPT | Performed by: OTOLARYNGOLOGY

## 2023-11-06 ASSESSMENT — PATIENT HEALTH QUESTIONNAIRE - PHQ9
2. FEELING DOWN, DEPRESSED OR HOPELESS: NOT AT ALL
SUM OF ALL RESPONSES TO PHQ9 QUESTIONS 1 AND 2: 0
1. LITTLE INTEREST OR PLEASURE IN DOING THINGS: NOT AT ALL

## 2023-11-06 NOTE — PROGRESS NOTES
"        Reason for Consult:  Follow-up (Ear injury to left ear.)     Subjective   History Of Present Illness:  Guido Quinones is a 28 y.o. male who in September had a accident while he was skateboarding that resulted in a left temporal bone fracture with subsequent facial nerve paralysis and decreased hearing. He received steroids after his fracture and his facial weakness on the left side is slowly recovering. He continues to have muffled hearing on the left side. Additionally, he had left-sided tinnitus. He does have positional dizziness.     Past Medical History:  He has no past medical history on file.    Surgical History:  He has no past surgical history on file.     Social History:  He reports that he has never smoked. He has never used smokeless tobacco. No history on file for alcohol use and drug use.    Family History:  Family history is unknown by patient.     Medications:  Current Outpatient Medications   Medication Instructions    lubricating eye drops ophthalmic solution 1 drop, Left Eye, 3 times daily PRN    ondansetron (Zofran) 4 mg tablet TAKE 1 TABLET BY MOUTH EVERY 12 HOURS AS NEEDED FOR NAUSEA    pantoprazole (PROTONIX) 40 mg, oral, Daily before breakfast, Do not crush, chew, or split.      Allergies:  Lamictal [lamotrigine]    Review of Systems:   A comprehensive 10-point review of systems was obtained including constitutional, neurological, HEENT, pulmonary, cardiovascular, genito-urinary, and other pertinent systems and was negative except as noted in the HPI.     Objective   Physical Exam:  Last Recorded Vitals: Height 1.88 m (6' 2\"), weight 86.2 kg (190 lb).    On physical exam, the patient is a well-nourished, well-developed patient, in no acute distress, able to communicate without assistance in English language. Head and face is atraumatic and normocephalic. Salivary glands are intact. Facial strength is weak on the left side. He has an HB 3/6 on the left side and normal function on the right " side.       On ear examination:  Right ear: The patient has cerumen impaction that was removed. The tympanic membrane is intact. AC>BC.  Left ear: The patient has an open and patent ear canal. The tympanic membrane is intact. BC>AC.  The Cabezas is left    On vestibular exam, the patient has no spontaneous nystagmus, no headshake nystagmus, no head-thrust nystagmus, and no nystagmus on hyperventilation or Valsalva maneuvers. Winfall-Hallpike maneuver is positive on the right side. We performed epley maneuvers.    On neuro exam, the patient is alert and oriented x3, cranial nerves are grossly intact, cerebellar exam is normal.      The rest of the exam, including anterior rhinoscopy, oropharyngeal exam, neck exam, and cardiovascular exam, were normal including no palpable lymphadenopathies, thyroid in the midline position, normal pulses, and normal chest excursion.     Reviewed Results:  Audiology Testing:  I personally reviewed the audiogram from 10/2023 that showed a normal hearing on the right side and moderate to moderately severe mixed hearing loss on the left side with a 100% discrimination bilaterally.           Imaging:  I personally reviewed the CT of the temporal bone from 10/2023 that showed a left-sided temporal bone fracture of the ear canal and mastoid with opacification of the mastoid and middle ear space.      Assessment/Plan   In summary, Guido Quinones is a 28 y.o. male with a left temporal bone fracture, associated recovering facial weakness on the left side, conductive hearing loss, and right-sided BPPV. We performed epley maneuvers today which he tolerated well.     I am going to see him back in 4 months with a CT scan and a new audiogram to see the status of his facial nerve recovery and conductive hearing loss.      Scribe Attestation  By signing my name below, IConnie Scribe   attest that this documentation has been prepared under the direction and in the presence of Epifanio Levy  MD.     ____________________________________________________  Epifanio Hernandez MD  Professor and Chief   Otology/Neurotology/Lateral Skull-Base Surgery   [facility]

## 2023-11-06 NOTE — PROGRESS NOTES
CHIEF COMPLAINT:   Chief Complaint   Patient presents with    Left Shoulder - Follow-up     History: 28 y.o. male presents to the office today for 6 weeks s/p L scapular body fracture with intra-articular extension, treating nonop.  He has been abiding by restrictions.  Has been in a sling.  Says he is still has some crepitus in the shoulder at times, but has been trying to move it and it does feel okay.    Past medical history, past surgical history, medications, allergies, family history, social history, and review of systems were reviewed today.    A 12 point review of systems was negative other than as stated in the HPI.    History reviewed. No pertinent past medical history.     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 No pertinent surgical history.     Family History   Family history unknown: Yes        Social History     Socioeconomic History    Marital status: Single     Spouse name: Not on file    Number of children: Not on file    Years of education: Not on file    Highest education level: Not on file   Occupational History    Not on file   Tobacco Use    Smoking status: Never    Smokeless tobacco: Never   Substance and Sexual Activity    Alcohol use: Not on file    Drug use: Not on file    Sexual activity: Not on file   Other Topics Concern    Not on file   Social History Narrative    Not on file     Social Determinants of Health     Financial Resource Strain: Low Risk  (10/6/2023)    Overall Financial Resource Strain (CARDIA)     Difficulty of Paying Living Expenses: Not hard at all   Food Insecurity: No Food Insecurity (10/6/2023)    Hunger Vital Sign     Worried About Running Out of Food in the Last Year: Never true     Ran Out of Food in the Last Year: Never true   Transportation Needs: No Transportation Needs (10/6/2023)    PRAPARE - Transportation     Lack of Transportation (Medical): No     Lack of Transportation (Non-Medical): No  "  Physical Activity: Not on file   Stress: Not on file   Social Connections: Not on file   Intimate Partner Violence: Not At Risk (10/6/2023)    Humiliation, Afraid, Rape, and Kick questionnaire     Fear of Current or Ex-Partner: No     Emotionally Abused: No     Physically Abused: No     Sexually Abused: No   Housing Stability: Low Risk  (10/6/2023)    Housing Stability Vital Sign     Unable to Pay for Housing in the Last Year: No     Number of Places Lived in the Last Year: 1     Unstable Housing in the Last Year: No        CURRENT MEDICATIONS:   Current Outpatient Medications   Medication Sig Dispense Refill    lubricating eye drops ophthalmic solution Administer 1 drop into the left eye 3 times a day as needed for dry eyes.      ondansetron (Zofran) 4 mg tablet TAKE 1 TABLET BY MOUTH EVERY 12 HOURS AS NEEDED FOR NAUSEA      pantoprazole (ProtoNix) 40 mg EC tablet Take 1 tablet (40 mg) by mouth once daily in the morning. Take before meals. Do not crush, chew, or split.       No current facility-administered medications for this visit.       Physical Examination:      10/6/2023     4:00 PM 10/6/2023     5:00 PM 10/6/2023     6:00 PM 10/6/2023     7:00 PM 10/6/2023     8:00 PM 10/6/2023     9:00 PM 11/6/2023     8:04 AM   Vitals   Systolic 133 129 130 116 137     Diastolic 94 79 80 79 94     Heart Rate 53 73 71 63 65 62    Temp 35.8 °C (96.5 °F)    37.6 °C (99.6 °F)     Resp 19 16 23 18 15 13    Height (in)       1.88 m (6' 2\")   Weight (lb)       190   BMI       24.39 kg/m2   BSA (m2)       2.12 m2   Visit Report       Report      There is no height or weight on file to calculate BMI.    Well-appearing, appears stated age, pleasant and cooperative, appropriate mood and behavior. Height and weight reviewed. Alert and oriented x3.  Auditory function intact.  No acute distress.  Intact ocular function, NYLA, EOMI. Breathing is unlabored .  There is no evidence of jugular venous distension. Skin appearance is normal " without evidence of rash or other lesions. 2+ radial pulses bilaterally, fingers pink and wwp, good capillary refill, no pitting edema. No appreciable lymphadenopathy in bilateral upper extremities. SILT throughout both upper extremities, median/radial/ulnar/musculocutaneous/axillary nerve motor and sensory intact (except for abnormalities noted in focused musculoskeletal exam section below).     Neck exam: Full range of motion of the neck in flexion/extension and rotational movements. No significant areas of tenderness to palpation in the neck.    On exam of bilateral upper extremities, demonstrates active forward flexion of the left side to 150 degrees, external rotation to 60, internal rotation T12.  Full range of motion elbow wrist and hand.    Imaging: Repeat x-rays of the left shoulder performed today.  Personally interpreted by myself.  The scapular body and superior glenoid fracture appears to be in stable alignment.  On the Grashey view, the joint is anatomically reduced.    Assessment: Left scapular body and glenoid fracture    Plan: At this point the patient is now 6 weeks status post injury.  We can begin physical therapy.  Do want to be gradual with his return to activities and not do any heavy lifting yet.  I want to see him back in 6 weeks with repeat radiographs of the left shoulder. PT script given and all questions answered.     Dragon software was used to dictate this note, please be aware that minor errors in transcription may be present.    Javier Hawkins MD    Shoulder/Elbow Surgery  [facility]
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
## 2023-11-07 ENCOUNTER — ANCILLARY PROCEDURE (OUTPATIENT)
Dept: RADIOLOGY | Facility: CLINIC | Age: 28
End: 2023-11-07
Payer: COMMERCIAL

## 2023-11-07 ENCOUNTER — OFFICE VISIT (OUTPATIENT)
Dept: ORTHOPEDIC SURGERY | Facility: CLINIC | Age: 28
End: 2023-11-07
Payer: COMMERCIAL

## 2023-11-07 DIAGNOSIS — S42.145A: ICD-10-CM

## 2023-11-07 DIAGNOSIS — S42.145A: Primary | ICD-10-CM

## 2023-11-07 PROCEDURE — 73030 X-RAY EXAM OF SHOULDER: CPT | Mod: LT,FY

## 2023-11-07 PROCEDURE — 1036F TOBACCO NON-USER: CPT | Performed by: STUDENT IN AN ORGANIZED HEALTH CARE EDUCATION/TRAINING PROGRAM

## 2023-11-07 PROCEDURE — 73030 X-RAY EXAM OF SHOULDER: CPT | Mod: LEFT SIDE | Performed by: RADIOLOGY

## 2023-11-07 PROCEDURE — 99213 OFFICE O/P EST LOW 20 MIN: CPT | Performed by: STUDENT IN AN ORGANIZED HEALTH CARE EDUCATION/TRAINING PROGRAM

## 2023-11-07 PROCEDURE — 99213 OFFICE O/P EST LOW 20 MIN: CPT | Mod: 25 | Performed by: STUDENT IN AN ORGANIZED HEALTH CARE EDUCATION/TRAINING PROGRAM

## 2023-11-16 ENCOUNTER — APPOINTMENT (OUTPATIENT)
Dept: PRIMARY CARE | Facility: CLINIC | Age: 28
End: 2023-11-16
Payer: COMMERCIAL

## 2023-12-07 ENCOUNTER — CLINICAL SUPPORT (OUTPATIENT)
Dept: PHYSICAL THERAPY | Facility: CLINIC | Age: 28
End: 2023-12-07
Payer: COMMERCIAL

## 2023-12-07 DIAGNOSIS — H81.11 BENIGN PAROXYSMAL POSITIONAL VERTIGO OF RIGHT EAR: ICD-10-CM

## 2023-12-07 PROCEDURE — 97530 THERAPEUTIC ACTIVITIES: CPT | Mod: GP

## 2023-12-07 PROCEDURE — 97161 PT EVAL LOW COMPLEX 20 MIN: CPT | Mod: GP
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
## 2023-12-12 ENCOUNTER — TREATMENT (OUTPATIENT)
Dept: PHYSICAL THERAPY | Facility: CLINIC | Age: 28
End: 2023-12-12
Payer: COMMERCIAL

## 2023-12-12 ENCOUNTER — OFFICE VISIT (OUTPATIENT)
Dept: PRIMARY CARE | Facility: CLINIC | Age: 28
End: 2023-12-12
Payer: COMMERCIAL

## 2023-12-12 VITALS
DIASTOLIC BLOOD PRESSURE: 82 MMHG | BODY MASS INDEX: 23.49 KG/M2 | WEIGHT: 183 LBS | SYSTOLIC BLOOD PRESSURE: 122 MMHG | OXYGEN SATURATION: 99 % | HEART RATE: 53 BPM | HEIGHT: 74 IN

## 2023-12-12 DIAGNOSIS — H91.92 HEARING LOSS OF LEFT EAR, UNSPECIFIED HEARING LOSS TYPE: ICD-10-CM

## 2023-12-12 DIAGNOSIS — S06.5X9D: ICD-10-CM

## 2023-12-12 DIAGNOSIS — H93.12 TINNITUS OF LEFT EAR: ICD-10-CM

## 2023-12-12 DIAGNOSIS — F07.81 POST CONCUSSIVE SYNDROME: ICD-10-CM

## 2023-12-12 DIAGNOSIS — S42.145A: ICD-10-CM

## 2023-12-12 DIAGNOSIS — S02.19XD CLOSED FRACTURE OF TEMPORAL BONE WITH ROUTINE HEALING: Primary | ICD-10-CM

## 2023-12-12 PROCEDURE — 97110 THERAPEUTIC EXERCISES: CPT | Mod: GP

## 2023-12-12 PROCEDURE — 97530 THERAPEUTIC ACTIVITIES: CPT | Mod: GP

## 2023-12-12 PROCEDURE — 99204 OFFICE O/P NEW MOD 45 MIN: CPT | Performed by: INTERNAL MEDICINE

## 2023-12-12 PROCEDURE — 1036F TOBACCO NON-USER: CPT | Performed by: INTERNAL MEDICINE
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
## 2023-12-19 ENCOUNTER — APPOINTMENT (OUTPATIENT)
Dept: ORTHOPEDIC SURGERY | Facility: CLINIC | Age: 28
End: 2023-12-19
Payer: COMMERCIAL

## 2023-12-19 ENCOUNTER — APPOINTMENT (OUTPATIENT)
Dept: RADIOLOGY | Facility: CLINIC | Age: 28
End: 2023-12-19
Payer: COMMERCIAL

## 2024-01-02 ENCOUNTER — DOCUMENTATION (OUTPATIENT)
Dept: PHYSICAL THERAPY | Facility: CLINIC | Age: 29
End: 2024-01-02
Payer: COMMERCIAL

## 2024-01-02 NOTE — PROGRESS NOTES
"Physical Therapy                 Therapy Communication Note    Patient Name: Edmundo Quinones \"Guido\"  MRN: [id number]  Today's Date: 1/2/2024     Discipline: Physical Therapy    Missed Visit Reason:      Missed Time: No Show    Comment:  Called and left message.  Also reminded pt of ortho appt on 1/9 at 2 pm: he is scheduled to attend PT on same day at 2:30: advised them to call and reschedule 1/9 PT appt, preferably after seeing ortho.  "

## 2024-01-09 ENCOUNTER — DOCUMENTATION (OUTPATIENT)
Dept: PHYSICAL THERAPY | Facility: CLINIC | Age: 29
End: 2024-01-09
Payer: COMMERCIAL

## 2024-01-09 NOTE — PROGRESS NOTES
"Physical Therapy                 Therapy Communication Note    Patient Name: Edmundo Quinones \"Guido\"  MRN: [id number]  Today's Date: 1/9/2024     Discipline: Physical Therapy    Missed Visit Reason:      Missed Time: No Show    Comment:  "

## 2024-01-16 ENCOUNTER — APPOINTMENT (OUTPATIENT)
Dept: PHYSICAL THERAPY | Facility: CLINIC | Age: 29
End: 2024-01-16
Payer: COMMERCIAL

## 2024-01-23 ENCOUNTER — APPOINTMENT (OUTPATIENT)
Dept: PHYSICAL THERAPY | Facility: CLINIC | Age: 29
End: 2024-01-23
Payer: COMMERCIAL

## 2024-01-30 ENCOUNTER — HOSPITAL ENCOUNTER (OUTPATIENT)
Dept: RADIOLOGY | Facility: CLINIC | Age: 29
Discharge: HOME | End: 2024-01-30
Payer: COMMERCIAL

## 2024-01-30 ENCOUNTER — DOCUMENTATION (OUTPATIENT)
Dept: PHYSICAL THERAPY | Facility: CLINIC | Age: 29
End: 2024-01-30

## 2024-01-30 ENCOUNTER — APPOINTMENT (OUTPATIENT)
Dept: PHYSICAL THERAPY | Facility: CLINIC | Age: 29
End: 2024-01-30
Payer: COMMERCIAL

## 2024-01-30 ENCOUNTER — OFFICE VISIT (OUTPATIENT)
Dept: ORTHOPEDIC SURGERY | Facility: CLINIC | Age: 29
End: 2024-01-30
Payer: COMMERCIAL

## 2024-01-30 DIAGNOSIS — S42.145A: ICD-10-CM

## 2024-01-30 PROCEDURE — 99213 OFFICE O/P EST LOW 20 MIN: CPT | Performed by: STUDENT IN AN ORGANIZED HEALTH CARE EDUCATION/TRAINING PROGRAM

## 2024-01-30 PROCEDURE — 1036F TOBACCO NON-USER: CPT | Performed by: STUDENT IN AN ORGANIZED HEALTH CARE EDUCATION/TRAINING PROGRAM

## 2024-01-30 PROCEDURE — 73030 X-RAY EXAM OF SHOULDER: CPT | Mod: LT

## 2024-01-30 PROCEDURE — 73030 X-RAY EXAM OF SHOULDER: CPT | Mod: LEFT SIDE | Performed by: RADIOLOGY

## 2024-01-30 NOTE — PROGRESS NOTES
"Physical Therapy                 Therapy Communication Note    Patient Name: Edmundo Quinones \"Guido\"  MRN: [id number]  Today's Date: 1/30/2024     Discipline: Physical Therapy    Missed Visit Reason:      Missed Time: Cancel    Comment:  Pt cancelled via [employer].  Not seen in PT clinic since 12/12/23.  Attempted to contact multiple times without response.  Per cancellation policy, will discharge from PT due to non compliance.  Evaluating PT in agreement.   "

## 2024-01-30 NOTE — PROGRESS NOTES
CHIEF COMPLAINT: No chief complaint on file.    History: 28 y.o. male presents to the office today for follow-up of his left shoulder.  He is now about 3 and half months status post left scapular fracture.  About 4 months out from the injury.  We have been treating him nonoperatively.  I last saw him about 2 months ago.  He has been essentially getting back to all of his activities and has minimal pain in the shoulder.  Here today for routine follow-up.    Past medical history, past surgical history, medications, allergies, family history, social history, and review of systems were reviewed today.    A 12 point review of systems was negative other than as stated in the HPI.    No past medical history on file.     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     Family History   Family history unknown: Yes        Social History     Socioeconomic History    Marital status: Single     Spouse name: Not on file    Number of children: Not on file    Years of education: Not on file    Highest education level: Not on file   Occupational History    Not on file   Tobacco Use    Smoking status: Never    Smokeless tobacco: Never   Substance and Sexual Activity    Alcohol use: Not Currently    Drug use: Not Currently    Sexual activity: Not on file   Other Topics Concern    Not on file   Social History Narrative    Not on file     Social Determinants of Health     Financial Resource Strain: Low Risk  (10/6/2023)    Overall Financial Resource Strain (CARDIA)     Difficulty of Paying Living Expenses: Not hard at all   Food Insecurity: No Food Insecurity (10/6/2023)    Hunger Vital Sign     Worried About Running Out of Food in the Last Year: Never true     Ran Out of Food in the Last Year: Never true   Transportation Needs: No Transportation Needs (10/6/2023)    PRAPARE - Transportation     Lack of Transportation (Medical): No     Lack of Transportation  "(Non-Medical): No   Physical Activity: Not on file   Stress: Not on file   Social Connections: Not on file   Intimate Partner Violence: Not At Risk (10/6/2023)    Humiliation, Afraid, Rape, and Kick questionnaire     Fear of Current or Ex-Partner: No     Emotionally Abused: No     Physically Abused: No     Sexually Abused: No   Housing Stability: Low Risk  (10/6/2023)    Housing Stability Vital Sign     Unable to Pay for Housing in the Last Year: No     Number of Places Lived in the Last Year: 1     Unstable Housing in the Last Year: No        CURRENT MEDICATIONS:   No current outpatient medications on file.     No current facility-administered medications for this visit.       Physical Examination:      10/6/2023     6:00 PM 10/6/2023     7:00 PM 10/6/2023     8:00 PM 10/6/2023     9:00 PM 11/6/2023     8:04 AM 12/12/2023     2:56 PM 12/12/2023     4:25 PM   Vitals   Systolic 130 116 137   122 122   Diastolic 80 79 94   90 82   Heart Rate 71 63 65 62  53    Temp   37.6 °C (99.6 °F)       Resp 23 18 15 13      Height (in)     1.88 m (6' 2\") 1.88 m (6' 2\")    Weight (lb)     190 183    BMI     24.39 kg/m2 23.5 kg/m2    BSA (m2)     2.12 m2 2.08 m2    Visit Report     Report Report Report      There is no height or weight on file to calculate BMI.    Well-appearing, appears stated age, pleasant and cooperative, appropriate mood and behavior. Height and weight reviewed. Alert and oriented x3.  Auditory function intact.  No acute distress.  Intact ocular function, NYLA, EOMI. Breathing is unlabored .  There is no evidence of jugular venous distension. Skin appearance is normal without evidence of rash or other lesions. 2+ radial pulses bilaterally, fingers pink and wwp, good capillary refill, no pitting edema. No appreciable lymphadenopathy in bilateral upper extremities. SILT throughout both upper extremities, median/radial/ulnar/musculocutaneous/axillary nerve motor and sensory intact (except for abnormalities noted " in focused musculoskeletal exam section below).     Neck exam: Full range of motion of the neck in flexion/extension and rotational movements. No significant areas of tenderness to palpation in the neck.    On exam of bilateral upper extremities, symmetric range of motion and strength of both shoulders.  Active forward flexion 180, external rotation 80, internal rotation to T12.  Full strength rotator cuff strength testing.  No tenderness palpation about the left shoulder or coracoid region.    Imaging: Radiographs left shoulder performed today.  Personally interpreted by myself.  Healed scapular fracture.  The glenoid joint surface is anatomic.    Assessment: Intra-articular scapular fracture    Plan: At this point the fracture is healed and very good alignment.  Using that his joint surface is essentially anatomic.  At this point he can go back to all of his activities as desired.  Told to be careful and gradually work up to higher level activities.  Questions were answered.  Follow-up as needed.    Dragon software was used to dictate this note, please be aware that minor errors in transcription may be present.    Javier Hawkins MD    Shoulder/Elbow Surgery  [facility] [place]

## 2024-02-06 ENCOUNTER — APPOINTMENT (OUTPATIENT)
Dept: PHYSICAL THERAPY | Facility: CLINIC | Age: 29
End: 2024-02-06

## 2024-02-13 ENCOUNTER — APPOINTMENT (OUTPATIENT)
Dept: PHYSICAL THERAPY | Facility: CLINIC | Age: 29
End: 2024-02-13

## 2024-02-20 ENCOUNTER — APPOINTMENT (OUTPATIENT)
Dept: PHYSICAL THERAPY | Facility: CLINIC | Age: 29
End: 2024-02-20

## 2024-02-27 ENCOUNTER — APPOINTMENT (OUTPATIENT)
Dept: PHYSICAL THERAPY | Facility: CLINIC | Age: 29
End: 2024-02-27

## 2024-02-29 ENCOUNTER — HOSPITAL ENCOUNTER (OUTPATIENT)
Dept: RADIOLOGY | Facility: HOSPITAL | Age: 29
Discharge: HOME | End: 2024-02-29
Payer: COMMERCIAL

## 2024-02-29 DIAGNOSIS — H90.2 CONDUCTIVE HEARING LOSS, UNSPECIFIED LATERALITY: ICD-10-CM

## 2024-02-29 PROCEDURE — 70480 CT ORBIT/EAR/FOSSA W/O DYE: CPT

## 2024-02-29 PROCEDURE — 70480 CT ORBIT/EAR/FOSSA W/O DYE: CPT | Performed by: RADIOLOGY

## 2024-03-04 ENCOUNTER — CLINICAL SUPPORT (OUTPATIENT)
Dept: AUDIOLOGY | Facility: CLINIC | Age: 29
End: 2024-03-04
Payer: COMMERCIAL

## 2024-03-04 ENCOUNTER — OFFICE VISIT (OUTPATIENT)
Dept: OTOLARYNGOLOGY | Facility: CLINIC | Age: 29
End: 2024-03-04
Payer: COMMERCIAL

## 2024-03-04 VITALS — BODY MASS INDEX: 22.84 KG/M2 | WEIGHT: 178 LBS | HEIGHT: 74 IN

## 2024-03-04 DIAGNOSIS — H74.22 DISCONTINUITY OF OSSICLES OF LEFT EAR: Primary | ICD-10-CM

## 2024-03-04 DIAGNOSIS — H90.12 CONDUCTIVE HEARING LOSS OF LEFT EAR WITH UNRESTRICTED HEARING OF RIGHT EAR: ICD-10-CM

## 2024-03-04 DIAGNOSIS — S02.19XD CLOSED FRACTURE OF TEMPORAL BONE WITH ROUTINE HEALING: ICD-10-CM

## 2024-03-04 DIAGNOSIS — H90.12 CONDUCTIVE HEARING LOSS OF LEFT EAR WITH UNRESTRICTED HEARING OF RIGHT EAR: Primary | ICD-10-CM

## 2024-03-04 DIAGNOSIS — H61.22 IMPACTED CERUMEN OF LEFT EAR: ICD-10-CM

## 2024-03-04 PROBLEM — H81.11 BENIGN PAROXYSMAL POSITIONAL VERTIGO OF RIGHT EAR: Status: RESOLVED | Noted: 2023-11-06 | Resolved: 2024-03-04

## 2024-03-04 PROCEDURE — 99213 OFFICE O/P EST LOW 20 MIN: CPT | Performed by: OTOLARYNGOLOGY

## 2024-03-04 PROCEDURE — 92557 COMPREHENSIVE HEARING TEST: CPT

## 2024-03-04 PROCEDURE — 92567 TYMPANOMETRY: CPT

## 2024-03-04 PROCEDURE — 1036F TOBACCO NON-USER: CPT | Performed by: OTOLARYNGOLOGY

## 2024-03-04 NOTE — PROGRESS NOTES
"ADULT AUDIOMETRIC EVALUATION      Name:  Edmundo Quinones \"Guido\"  :  1995  Age:  28 y.o.  Date of Evaluation:  3/4/2024    IMPRESSIONS     Today's test results are consistent with normal hearing sensitivity in the RE and a moderate to moderately-severe conductive hearing loss in the LE. Results show improvement in LE when compared with most recent hearing evaluation performed on 10/2023. Discussed results and recommendations with patient.  Questions were addressed and the patient was encouraged to contact our department should concerns arise.    RECOMMENDATIONS     Continue medical follow up with PCP/ENT.  Return for evaluation following any medical management.     Time: 8747-8359    HISTORY     Edmundo Quinones is seen today in conjunction with ENT appointment.  Patient has a known history of left temporal bone fracture with subsequent facial nerve paralysis and decreased hearing. Today, patient reported improvement in hearing.    TEST RESULTS     Otoscopic Evaluation:  Physical exam to evaluate the outer ear  Right Ear: Clear ear canal.  Left Ear: Clear ear canal.    Tympanometry & Acoustic Reflexes:  Assesses the function of the middle ear and inner ear structures  Right Ear: Tympanometry revealed normal ear canal volume, peak pressure, and compliance, consistent with normal middle ear function (Type A).   Left Ear: Tympanometry revealed normal ear canal volume and peak pressure, with hypercompliance (Type A-d).     Distortion Product Otoacoustic Emissions: Assesses the cochlear outer hair cell function (8671-0925 Hz frequency range)  DNT.    Pure Tone Audiometry:  Conventional Audiometry via inserts with good reliability  Right Ear: Hearing sensitivity WNL.   Left Ear: Moderate to moderately-severe conductive hearing loss.    Speech Audiometry:   Right Ear:  Speech Reception Threshold (SRT) was obtained at 15 dBHL. Speech reception threshold in agreement with pure tone average. Word Recognition scores were " excellent (100%) in quiet when words were presented at 55 dBHL.   Left Ear:  Speech Reception Threshold (SRT) was obtained at 45 dBHL. Speech reception threshold in agreement with pure tone average. Word Recognition scores were excellent (100%) in quiet when words were presented at 75 dBHL.       Testing and interpretation of results completed Chana Del Rosario CCC-A CCVR. It was my pleasure to evaluate this patient.       CHANA Del Rosario, CCC-A CCVR  Senior Clinical Vestibular Audiologist    Degree of Hearing Sensitivity Decibel Range   Within Normal Limits (WNL) 0-25   Mild 26-40   Moderate 41-55   Moderately-Severe 56-70   Severe 71-90   Profound 91+      Key   CNT/DNT Could Not Test/Did Not Test   TM Tympanic Membrane   WNL Within Normal Limits   HA Hearing Aid   SNHL Sensorineural Hearing Loss   CHL Conductive Hearing Loss   NIHL Noise-Induced Hearing Loss   ECV Ear Canal Volume   RE/LE Right Ear/Left Ear        AUDIOGRAM

## 2024-03-04 NOTE — LETTER
"March 7, 2024     NEHA Leger  [address] [place] [address] [address]  [address]  [facility] [address]    Patient: Guido Quinones   YOB: 1995   Date of Visit: 3/4/2024       Dear NEHA Burger:    Thank you for referring Guido Quinones to me for evaluation. Below are my notes for this consultation.  If you have questions, please do not hesitate to call me. I look forward to following your patient along with you.       Sincerely,     Epifanio Levy MD      CC: May Cheng MD  ______________________________________________________________________________________            Reason for Consult:  Follow-up (4 month follow up visit with hearing test)     Subjective  History Of Present Illness:  Edmundo Quinones \"Calli" is a 28 y.o. male with left-sided temporal bone fracture on 10/2023. He has persisted to have a left-sided conductive hearing loss and right-sided BPPV.    In the last visit, I wanted to wait 4 months to see if the hearing improved. He is coming to see me after a CT scan and an audiogram. In his last visit, we also did epi maneuvers for his right-sided BPPV.     Since the last time I saw him, he has been doing well. His dizziness completely resolved, but he continues to have muffled hearing and fullness sensation on the left ear.     Past Medical History:  He has no past medical history on file.    Surgical History:  He has no past surgical history on file.     Social History:  He reports that he has never smoked. He has never used smokeless tobacco. He reports that he does not currently use alcohol. He reports that he does not currently use drugs.    Family History:  Family history is unknown by patient.     Medications:  No current outpatient medications   Allergies:  Lamictal [lamotrigine]    Review of Systems:   A comprehensive 10-point review of systems was obtained including constitutional, neurological, HEENT, pulmonary, cardiovascular, genito-urinary, and other pertinent systems and " "was negative except as noted in the HPI.     Objective  Physical Exam:  Last Recorded Vitals: Height 1.88 m (6' 2\"), weight 80.7 kg (178 lb).    On physical exam, the patient is a well-nourished, well-developed patient, in no acute distress, able to communicate without assistance in English language. Head and face is atraumatic and normocephalic. Salivary glands are intact. Facial strength is symmetrical bilaterally.       On ear examination:  Right ear: The patient has an open and patent ear canal. The tympanic membrane is intact.  AC>BC  Left ear: The patient has cerumen impaction which was removed. Upon removal, the tympanic membrane is intact with good aeration of the middle ear space.  BC>AC  The Cabezas is left    On vestibular exam, the patient has no spontaneous nystagmus, no headshake nystagmus, no head-thrust nystagmus, and no nystagmus on hyperventilation or Valsalva maneuvers. Dulzura-Hallpike maneuver is negative bilaterally.       On neuro exam, the patient is alert and oriented x3, cranial nerves are grossly intact, cerebellar exam is normal.      The rest of the exam, including anterior rhinoscopy, oropharyngeal exam, neck exam, and cardiovascular exam, were normal including no palpable lymphadenopathies, thyroid in the midline position, normal pulses, and normal chest excursion.       Reviewed Results:  Audiology Testing:  I personally reviewed the audiogram from 03/2024 that showed normal hearing on the right side and a moderate conductive hearing loss on the left side with 30-40dB of air-borne gap. He has 100% discrimination bilaterally. He has Type A tympanogram on the right and Type AD  tympanograms on the left.      I personally reviewed the audiogram from 10/2023 that showed a normal hearing on the right side and moderate to moderately severe mixed hearing loss on the left side with a 100% discrimination bilaterally.       Imaging:  I personally reviewed the CT of the temporal bone from 02/2024 that " "showed normal middle ear and mastoid and ossicular chain on the right side. On the left, there is clear evidence of a luxation of the incus. Both the IS joint and NIM joint are disarticulated. There is no connection between the malleus and the stapes.The middle ear space is well aerated and the mastoid looks intact.    I personally reviewed the CT of the temporal bone from 10/2023 that showed a left-sided temporal bone fracture of the ear canal and mastoid with opacification of the mastoid and middle ear space.        Procedure:  None    Assessment/Plan    1. Discontinuity of ossicles of left ear    2. Conductive hearing loss of left ear with unrestricted hearing of right ear    3. Closed fracture of temporal bone with routine healing    4. Impacted cerumen of left ear        In summary, Edmundo Quinones \"Guido\" is a 28 y.o. male who had a temporal bone fracture on the left side in 10/2023. This resulted on a disarticulation of the ossicular chain with complete luxation of the incus. There is no connection between the malleus and the stapes. He has associated 30-40dB of air-borne gap.     Today, we discussed options of tympanoplasty with OCR vs. Hearing aids. He is not ready to make any decisions at this time. He will call us if he decides to move forward with surgery.        Scribe Attestation  By signing my name below, I, Connie Gaurang , Scribe   attest that this documentation has been prepared under the direction and in the presence of Epifanio Levy MD.   ____________________________________________________  Epifanio Hernandez MD  Professor and Chief   Otology/Neurotology/Lateral Skull-Base Surgery   [facility]  "

## 2024-03-04 NOTE — PROGRESS NOTES
"        Reason for Consult:  Follow-up (4 month follow up visit with hearing test)     Subjective   History Of Present Illness:  Edmundo Quinones \"Guido\" is a 28 y.o. male with left-sided temporal bone fracture on 10/2023. He has persisted to have a left-sided conductive hearing loss and right-sided BPPV.    In the last visit, I wanted to wait 4 months to see if the hearing improved. He is coming to see me after a CT scan and an audiogram. In his last visit, we also did epi maneuvers for his right-sided BPPV.     Since the last time I saw him, he has been doing well. His dizziness completely resolved, but he continues to have muffled hearing and fullness sensation on the left ear.     Past Medical History:  He has no past medical history on file.    Surgical History:  He has no past surgical history on file.     Social History:  He reports that he has never smoked. He has never used smokeless tobacco. He reports that he does not currently use alcohol. He reports that he does not currently use drugs.    Family History:  Family history is unknown by patient.     Medications:  No current outpatient medications   Allergies:  Lamictal [lamotrigine]    Review of Systems:   A comprehensive 10-point review of systems was obtained including constitutional, neurological, HEENT, pulmonary, cardiovascular, genito-urinary, and other pertinent systems and was negative except as noted in the HPI.     Objective   Physical Exam:  Last Recorded Vitals: Height 1.88 m (6' 2\"), weight 80.7 kg (178 lb).    On physical exam, the patient is a well-nourished, well-developed patient, in no acute distress, able to communicate without assistance in English language. Head and face is atraumatic and normocephalic. Salivary glands are intact. Facial strength is symmetrical bilaterally.       On ear examination:  Right ear: The patient has an open and patent ear canal. The tympanic membrane is intact.  AC>BC  Left ear: The patient has cerumen " impaction which was removed. Upon removal, the tympanic membrane is intact with good aeration of the middle ear space.  BC>AC  The Cabezas is left    On vestibular exam, the patient has no spontaneous nystagmus, no headshake nystagmus, no head-thrust nystagmus, and no nystagmus on hyperventilation or Valsalva maneuvers. [place]-Hallpike maneuver is negative bilaterally.       On neuro exam, the patient is alert and oriented x3, cranial nerves are grossly intact, cerebellar exam is normal.      The rest of the exam, including anterior rhinoscopy, oropharyngeal exam, neck exam, and cardiovascular exam, were normal including no palpable lymphadenopathies, thyroid in the midline position, normal pulses, and normal chest excursion.       Reviewed Results:  Audiology Testing:  I personally reviewed the audiogram from 03/2024 that showed normal hearing on the right side and a moderate conductive hearing loss on the left side with 30-40dB of air-borne gap. He has 100% discrimination bilaterally. He has Type A tympanogram on the right and Type AD  tympanograms on the left.      I personally reviewed the audiogram from 10/2023 that showed a normal hearing on the right side and moderate to moderately severe mixed hearing loss on the left side with a 100% discrimination bilaterally.       Imaging:  I personally reviewed the CT of the temporal bone from 02/2024 that showed normal middle ear and mastoid and ossicular chain on the right side. On the left, there is clear evidence of a luxation of the incus. Both the IS joint and NIM joint are disarticulated. There is no connection between the malleus and the stapes.The middle ear space is well aerated and the mastoid looks intact.    I personally reviewed the CT of the temporal bone from 10/2023 that showed a left-sided temporal bone fracture of the ear canal and mastoid with opacification of the mastoid and middle ear space.        Procedure:  None    Assessment/Plan     1.  "Discontinuity of ossicles of left ear    2. Conductive hearing loss of left ear with unrestricted hearing of right ear    3. Closed fracture of temporal bone with routine healing    4. Impacted cerumen of left ear        In summary, Edmundo Quinones \"Guido\" is a 28 y.o. male who had a temporal bone fracture on the left side in 10/2023. This resulted on a disarticulation of the ossicular chain with complete luxation of the incus. There is no connection between the malleus and the stapes. He has associated 30-40dB of air-borne gap.     Today, we discussed options of tympanoplasty with OCR vs. Hearing aids. He is not ready to make any decisions at this time. He will call us if he decides to move forward with surgery.        Scribe Attestation  By signing my name below, I, Connie Gaurang , Scribe   attest that this documentation has been prepared under the direction and in the presence of Epifanio Levy MD.   ____________________________________________________  Epifanio Hernandez MD  Professor and Chief   Otology/Neurotology/Lateral Skull-Base Surgery   [facility]  "

## 2024-03-05 ENCOUNTER — APPOINTMENT (OUTPATIENT)
Dept: PHYSICAL THERAPY | Facility: CLINIC | Age: 29
End: 2024-03-05

## 2024-03-12 ENCOUNTER — APPOINTMENT (OUTPATIENT)
Dept: PHYSICAL THERAPY | Facility: CLINIC | Age: 29
End: 2024-03-12

## 2024-03-19 ENCOUNTER — APPOINTMENT (OUTPATIENT)
Dept: PHYSICAL THERAPY | Facility: CLINIC | Age: 29
End: 2024-03-19

## 2024-06-12 ENCOUNTER — APPOINTMENT (OUTPATIENT)
Dept: PRIMARY CARE | Facility: CLINIC | Age: 29
End: 2024-06-12
Payer: COMMERCIAL

## 2024-07-22 ENCOUNTER — APPOINTMENT (OUTPATIENT)
Dept: OTOLARYNGOLOGY | Facility: CLINIC | Age: 29
End: 2024-07-22
Payer: COMMERCIAL

## 2025-03-03 ENCOUNTER — APPOINTMENT (OUTPATIENT)
Dept: OTOLARYNGOLOGY | Facility: CLINIC | Age: 30
End: 2025-03-03
Payer: COMMERCIAL